# Patient Record
Sex: FEMALE | Race: WHITE
[De-identification: names, ages, dates, MRNs, and addresses within clinical notes are randomized per-mention and may not be internally consistent; named-entity substitution may affect disease eponyms.]

---

## 2018-02-10 ENCOUNTER — HOSPITAL ENCOUNTER (EMERGENCY)
Dept: HOSPITAL 17 - NEPE | Age: 63
Discharge: HOME | End: 2018-02-10
Payer: COMMERCIAL

## 2018-02-10 VITALS — HEIGHT: 62 IN | WEIGHT: 132.28 LBS | BODY MASS INDEX: 24.34 KG/M2

## 2018-02-10 VITALS — TEMPERATURE: 98.7 F | HEART RATE: 101 BPM | OXYGEN SATURATION: 99 % | RESPIRATION RATE: 22 BRPM

## 2018-02-10 DIAGNOSIS — Z72.0: ICD-10-CM

## 2018-02-10 DIAGNOSIS — J40: Primary | ICD-10-CM

## 2018-02-10 DIAGNOSIS — J44.9: ICD-10-CM

## 2018-02-10 DIAGNOSIS — R05: ICD-10-CM

## 2018-02-10 LAB
ALBUMIN SERPL-MCNC: 3.6 GM/DL (ref 3.4–5)
ALP SERPL-CCNC: 85 U/L (ref 45–117)
ALT SERPL-CCNC: 19 U/L (ref 10–53)
AST SERPL-CCNC: 20 U/L (ref 15–37)
BASOPHILS # BLD AUTO: 0.1 TH/MM3 (ref 0–0.2)
BASOPHILS NFR BLD: 1.1 % (ref 0–2)
BILIRUB SERPL-MCNC: 0.1 MG/DL (ref 0.2–1)
BUN SERPL-MCNC: 9 MG/DL (ref 7–18)
CALCIUM SERPL-MCNC: 8.6 MG/DL (ref 8.5–10.1)
CHLORIDE SERPL-SCNC: 108 MEQ/L (ref 98–107)
CREAT SERPL-MCNC: 0.82 MG/DL (ref 0.5–1)
EOSINOPHIL # BLD: 1.5 TH/MM3 (ref 0–0.4)
EOSINOPHIL NFR BLD: 16.4 % (ref 0–4)
ERYTHROCYTE [DISTWIDTH] IN BLOOD BY AUTOMATED COUNT: 13.1 % (ref 11.6–17.2)
GFR SERPLBLD BASED ON 1.73 SQ M-ARVRAT: 71 ML/MIN (ref 89–?)
GLUCOSE SERPL-MCNC: 123 MG/DL (ref 74–106)
HCO3 BLD-SCNC: 29.6 MEQ/L (ref 21–32)
HCT VFR BLD CALC: 44.4 % (ref 35–46)
HGB BLD-MCNC: 15 GM/DL (ref 11.6–15.3)
LYMPHOCYTES # BLD AUTO: 2.6 TH/MM3 (ref 1–4.8)
LYMPHOCYTES NFR BLD AUTO: 28.2 % (ref 9–44)
MCH RBC QN AUTO: 31.3 PG (ref 27–34)
MCHC RBC AUTO-ENTMCNC: 33.9 % (ref 32–36)
MCV RBC AUTO: 92.4 FL (ref 80–100)
MONOCYTE #: 0.5 TH/MM3 (ref 0–0.9)
MONOCYTES NFR BLD: 5.7 % (ref 0–8)
NEUTROPHILS # BLD AUTO: 4.6 TH/MM3 (ref 1.8–7.7)
NEUTROPHILS NFR BLD AUTO: 48.6 % (ref 16–70)
PLATELET # BLD: 311 TH/MM3 (ref 150–450)
PMV BLD AUTO: 7.5 FL (ref 7–11)
PROT SERPL-MCNC: 7.1 GM/DL (ref 6.4–8.2)
RBC # BLD AUTO: 4.8 MIL/MM3 (ref 4–5.3)
SODIUM SERPL-SCNC: 143 MEQ/L (ref 136–145)
WBC # BLD AUTO: 9.4 TH/MM3 (ref 4–11)

## 2018-02-10 PROCEDURE — 99283 EMERGENCY DEPT VISIT LOW MDM: CPT

## 2018-02-10 PROCEDURE — 85025 COMPLETE CBC W/AUTO DIFF WBC: CPT

## 2018-02-10 PROCEDURE — 87804 INFLUENZA ASSAY W/OPTIC: CPT

## 2018-02-10 PROCEDURE — 94664 DEMO&/EVAL PT USE INHALER: CPT

## 2018-02-10 PROCEDURE — 80053 COMPREHEN METABOLIC PANEL: CPT

## 2018-02-10 NOTE — PD
HPI


Chief Complaint:  Respiratory Symptoms


Time Seen by Provider:  03:31


Travel History


International Travel<30 days:  No


Contact w/Intl Traveler<30days:  No


Traveled to known affect area:  No





History of Present Illness


HPI


Patient is a 62-year-old female coming in complaining of shortness of breath 

cough waking up feeling like she is got phlegm that she cannot get out of her 

chest.  She was in urgent care on the seventh which is 2 days ago she was given 

an HFA inhaler of albuterol and she was given a Augmentin prescription but it 

is not relieving her symptoms.  She says she cannot take prednisone because 

when she took a Medrol Juliano and after was gone she felt severely depleted 

depressed and muscle aches for a week she is refusing prednisone here in the ER.





PFSH


Past Medical History


COPD:  Yes





Past Surgical History


Surgical History:  No Previous Surgery





Social History


Alcohol Use:  Yes


Tobacco Use:  Yes


Substance Use:  No





Allergies-Medications


(Allergen,Severity, Reaction):  


Coded Allergies:  


     No Known Allergies (Unverified , 2/10/18)


Reported Meds & Prescriptions





Reported Meds & Active Scripts


Active


Reported


Augmentin (Amoxicillin-Clavulanate) 875-125 Mg Tab 1 Tab PO BID


Proventil Hfa 6.7 GM Inh (Albuterol Sulfate) 90 Mcg/Act Aer 2 Puff INH Q4-6H PRN








Review of Systems


Except as stated in HPI:  all other systems reviewed are Neg


General / Constitutional:  Positive: Fever


Respiratory:  Positive: Cough, Shortness of Breath, Wheezing





Physical Exam


Narrative


GENERAL: Nontoxic-appearing she was the satting to 92 on room air


SKIN: Warm and dry.


HEAD: Atraumatic. Normocephalic. 


EYES: Pupils equal and round. No scleral icterus. No injection or drainage. 


ENT: No nasal bleeding or discharge.  Mucous membranes pink and moist.


NECK: Trachea midline. No JVD. 


CARDIOVASCULAR: Regular rate and rhythm.  


RESPIRATORY: No accessory muscle use.  wheezing diffusely through all lung 

fields


GASTROINTESTINAL: Abdomen soft, non-tender, nondistended. Hepatic and splenic 

margins not palpable. 


MUSCULOSKELETAL: Extremities without clubbing, cyanosis, or edema. No obvious 

deformities. 


NEUROLOGICAL: Awake and alert. No obvious cranial nerve deficits.  Motor 

grossly within normal limits. Five out of 5 muscle strength in the arms and 

legs.  Normal speech.


PSYCHIATRIC: Appropriate mood and affect; insight and judgment normal.





Data


Data


Last Documented VS





Vital Signs








  Date Time  Temp Pulse Resp B/P (MAP) Pulse Ox O2 Delivery O2 Flow Rate FiO2


 


2/10/18 03:25 98.7 101 22  99   








Orders





 Orders


Comprehensive Metabolic Panel (2/10/18 03:39)


Complete Blood Count With Diff (2/10/18 03:39)


Influenzae A/B Antigen (2/10/18 03:39)


Albuterol-Ipratropium Neb (Duoneb Neb) (2/10/18 04:15)


Guaifen-Cod 200-20 Mg/10ml Liq (Robituss (2/10/18 04:30)


Fluticasone 110 Mcg Inh (Flovent Hfa 110 (2/10/18 05:30)





Labs





Laboratory Tests








Test


  2/10/18


03:47


 


White Blood Count 9.4 TH/MM3 


 


Red Blood Count 4.80 MIL/MM3 


 


Hemoglobin 15.0 GM/DL 


 


Hematocrit 44.4 % 


 


Mean Corpuscular Volume 92.4 FL 


 


Mean Corpuscular Hemoglobin 31.3 PG 


 


Mean Corpuscular Hemoglobin


Concent 33.9 % 


 


 


Red Cell Distribution Width 13.1 % 


 


Platelet Count 311 TH/MM3 


 


Mean Platelet Volume 7.5 FL 


 


Neutrophils (%) (Auto) 48.6 % 


 


Lymphocytes (%) (Auto) 28.2 % 


 


Monocytes (%) (Auto) 5.7 % 


 


Eosinophils (%) (Auto) 16.4 % 


 


Basophils (%) (Auto) 1.1 % 


 


Neutrophils # (Auto) 4.6 TH/MM3 


 


Lymphocytes # (Auto) 2.6 TH/MM3 


 


Monocytes # (Auto) 0.5 TH/MM3 


 


Eosinophils # (Auto) 1.5 TH/MM3 


 


Basophils # (Auto) 0.1 TH/MM3 


 


CBC Comment DIFF FINAL 


 


Differential Comment  


 


Blood Urea Nitrogen 9 MG/DL 


 


Creatinine 0.82 MG/DL 


 


Random Glucose 123 MG/DL 


 


Total Protein 7.1 GM/DL 


 


Albumin 3.6 GM/DL 


 


Calcium Level 8.6 MG/DL 


 


Alkaline Phosphatase 85 U/L 


 


Aspartate Amino Transf


(AST/SGOT) 20 U/L 


 


 


Alanine Aminotransferase


(ALT/SGPT) 19 U/L 


 


 


Total Bilirubin 0.1 MG/DL 


 


Sodium Level 143 MEQ/L 


 


Potassium Level 4.6 MEQ/L 


 


Chloride Level 108 MEQ/L 


 


Carbon Dioxide Level 29.6 MEQ/L 


 


Anion Gap 5 MEQ/L 


 


Estimat Glomerular Filtration


Rate 71 ML/MIN 


 











MDM


Medical Decision Making


Medical Screen Exam Complete:  Yes


Emergency Medical Condition:  Yes


Differential Diagnosis


Differential diagnosis includes reactive airway bronchitis viral influenza


Narrative Course


Patient feels much better after Flovent HFA and Atrovent as well as cough syrup 

with codeine discharged home with the same.  Patient feels much better after 

the treatment in the ER and is stable to go room air sat is 96-97%





Diagnosis





 Primary Impression:  


 Bronchitis


Patient Instructions:  Acute Bronchitis (ED), General Instructions


Scripts


Guaifenesin-Codeine Liq (Guaifenesin AC Liq) 100-10 Mg/5 Ml Syrp


10 ML PO Q6H Y for COUGH, #1 BOTTLE 0 Refills


   Prov: Tai Mariano MD         2/10/18 


Ipratropium HFA 12.9 GM Inh (Atrovent HFA 12.9 GM Inh) 17 Mcg/Actuation Aer


2 PUFF INH QID, #1 INHALER 0 Refills


   Prov: Tai Mariano MD         2/10/18


Disposition:  01 DISCHARGE HOME


Condition:  Good











Tai Mariano MD Feb 10, 2018 04:07